# Patient Record
Sex: MALE | Race: WHITE | NOT HISPANIC OR LATINO | Employment: FULL TIME | URBAN - METROPOLITAN AREA
[De-identification: names, ages, dates, MRNs, and addresses within clinical notes are randomized per-mention and may not be internally consistent; named-entity substitution may affect disease eponyms.]

---

## 2017-06-21 ENCOUNTER — GENERIC CONVERSION - ENCOUNTER (OUTPATIENT)
Dept: OTHER | Facility: OTHER | Age: 42
End: 2017-06-21

## 2018-01-11 NOTE — MISCELLANEOUS
Message   Recorded as Task   Date: 06/21/2017 09:41 AM, Created By: Judson Fontaine   Task Name: Follow Up   Assigned To: Idalia Manjarrez   Regarding Patient: Tyson Gan, Status: Active   Comment:    Michael Wenla - 21 Jun 2017 9:41 AM     TASK CREATED  Please call patient to schedule 3 year f/u appointment  Thank you  Per assigned task, I left message for ÁNGEL HERNANDEZ patient about scheduling an appointment at our office since patient is overdue for a follow up  Active Problems    1  Abnormal blood level of iron (790 6) (R79 0)   2  Arthritis (716 90) (M19 90)   3  Morbid or severe obesity due to excess calories (278 01) (E66 01)   4  Obesity (278 00) (E66 9)   5  Postgastrectomy malabsorption (579 3) (K91 2,Z90 3)   6  Pre-operative cardiovascular examination (V72 81) (Z01 810)   7  Status post gastric bypass for obesity (V45 86) (Z98 84)    Current Meds   1  Calcium + D TABS; TAKE AS DIRECTED - taking 2 tablets daily; Therapy: [phone] to Recorded   2  Multivitamins Oral Capsule Recorded   3  Vitamin C TABS; take 50,000 iu weekly; Therapy: [phone] to Recorded   4  Vitamin D3 Liquid Recorded    Allergies    1  No Known Drug Allergies    2   Animal dander - Cats    Signatures   Electronically signed by : Megan Lennon, ; Jun 21 2017 10:00AM EST                       (Author)

## 2018-07-09 ENCOUNTER — TELEPHONE (OUTPATIENT)
Dept: BARIATRICS | Facility: CLINIC | Age: 43
End: 2018-07-09

## 2018-07-09 NOTE — TELEPHONE ENCOUNTER
----- Message from Chris Kapoor RN sent at 7/3/2018 10:37 AM EDT -----  Regardin yr f/u appointment  Please call patient to schedule f/u appointment with office and document when call is completed  Thank you

## 2018-12-31 ENCOUNTER — HOSPITAL ENCOUNTER (EMERGENCY)
Facility: HOSPITAL | Age: 43
Discharge: HOME | End: 2018-12-31
Attending: EMERGENCY MEDICINE
Payer: COMMERCIAL

## 2018-12-31 ENCOUNTER — APPOINTMENT (EMERGENCY)
Dept: RADIOLOGY | Facility: HOSPITAL | Age: 43
End: 2018-12-31
Payer: COMMERCIAL

## 2018-12-31 VITALS
SYSTOLIC BLOOD PRESSURE: 179 MMHG | TEMPERATURE: 98.6 F | OXYGEN SATURATION: 97 % | HEART RATE: 88 BPM | BODY MASS INDEX: 58.94 KG/M2 | RESPIRATION RATE: 18 BRPM | WEIGHT: 315 LBS | DIASTOLIC BLOOD PRESSURE: 104 MMHG
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
## 2018-12-31 DIAGNOSIS — S82.899A ANKLE FRACTURE: Primary | ICD-10-CM

## 2018-12-31 PROCEDURE — 90715 TDAP VACCINE 7 YRS/> IM: CPT | Performed by: PHYSICIAN ASSISTANT

## 2018-12-31 PROCEDURE — 99284 EMERGENCY DEPT VISIT MOD MDM: CPT

## 2018-12-31 PROCEDURE — 73610 X-RAY EXAM OF ANKLE: CPT

## 2018-12-31 PROCEDURE — 90471 IMMUNIZATION ADMIN: CPT

## 2018-12-31 RX ORDER — TRAMADOL HYDROCHLORIDE 50 MG/1
50 TABLET ORAL EVERY 6 HOURS PRN
Qty: 12 TABLET | Refills: 0 | Status: SHIPPED | OUTPATIENT
Start: 2018-12-31 | End: 2019-01-12

## 2018-12-31 RX ORDER — OXYCODONE HYDROCHLORIDE AND ACETAMINOPHEN 5; 325 MG/1; MG/1
1 TABLET ORAL EVERY 6 HOURS PRN
Qty: 12 TABLET | Refills: 0 | Status: SHIPPED | OUTPATIENT
Start: 2018-12-31 | End: 2018-12-31

## 2018-12-31 RX ORDER — BACITRACIN, NEOMYCIN, POLYMYXIN B 400; 3.5; 5 [USP'U]/G; MG/G; [USP'U]/G
1 OINTMENT TOPICAL ONCE
Status: COMPLETED | OUTPATIENT
Start: 2018-12-31 | End: 2018-12-31

## 2018-12-31 RX ORDER — OXYCODONE HYDROCHLORIDE AND ACETAMINOPHEN 5; 325 MG/1; MG/1
1 TABLET ORAL ONCE
Status: DISCONTINUED | OUTPATIENT
Start: 2018-12-31 | End: 2018-12-31

## 2018-12-31 RX ORDER — TRAMADOL HYDROCHLORIDE 50 MG/1
50 TABLET ORAL ONCE
Status: COMPLETED | OUTPATIENT
Start: 2018-12-31 | End: 2018-12-31

## 2018-12-31 RX ADMIN — BACITRACIN, NEOMYCIN, POLYMYXIN B 1 SMALL APPLICATION: 400; 3.5; 5 OINTMENT TOPICAL at 09:24

## 2018-12-31 RX ADMIN — TRAMADOL HYDROCHLORIDE 50 MG: 50 TABLET, COATED ORAL at 09:01

## 2018-12-31 RX ADMIN — TETANUS TOXOID, REDUCED DIPHTHERIA TOXOID AND ACELLULAR PERTUSSIS VACCINE, ADSORBED 0.5 ML: 5; 2.5; 8; 8; 2.5 SUSPENSION INTRAMUSCULAR at 09:24
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
## 2020-08-13 ENCOUNTER — OFFICE VISIT (OUTPATIENT)
Dept: URGENT CARE | Facility: MEDICAL CENTER | Age: 45
End: 2020-08-13
Payer: COMMERCIAL

## 2020-08-13 VITALS
BODY MASS INDEX: 42.66 KG/M2 | DIASTOLIC BLOOD PRESSURE: 104 MMHG | SYSTOLIC BLOOD PRESSURE: 142 MMHG | OXYGEN SATURATION: 100 % | WEIGHT: 315 LBS | HEART RATE: 78 BPM | RESPIRATION RATE: 18 BRPM | TEMPERATURE: 97.9 F | HEIGHT: 72 IN

## 2020-08-13 DIAGNOSIS — L02.92 BOIL: Primary | ICD-10-CM

## 2020-08-13 PROCEDURE — 99213 OFFICE O/P EST LOW 20 MIN: CPT | Performed by: PHYSICIAN ASSISTANT

## 2020-08-13 RX ORDER — SULFAMETHOXAZOLE AND TRIMETHOPRIM 800; 160 MG/1; MG/1
1 TABLET ORAL EVERY 12 HOURS SCHEDULED
Qty: 14 TABLET | Refills: 0 | Status: SHIPPED | OUTPATIENT
Start: 2020-08-13 | End: 2020-08-20

## 2020-08-13 NOTE — PROGRESS NOTES
[facility]        NAME: Vince Conway is a 41 y o  male  : 1975    MRN: [id number]  DATE: 2020  TIME: 5:05 PM    Assessment and Plan   Boil [L02 92]  1  Boil  sulfamethoxazole-trimethoprim (BACTRIM DS) 800-160 mg per tablet         Patient Instructions     1  Take Bactrim DS 1 tablet twice daily x 7 days  2  Apply warm compresses to the skin 2-3x daily until resolved  3  Motrin as needed for pain  4  Follow-up for I &D if symptoms persist or worsen  Chief Complaint     Chief Complaint   Patient presents with    Mass     Mass on right side of pt's neck x 5 days  History of Present Illness       Caryn Bui is a 43-year-old male presents with a right-sided facial lesion developed over the past 5 days  Patient reports symptoms started with a pimple the gradually increased in size  Patient has been applying warm compresses to the area and has noticed improvement over the past 24 hours  He denies any fever or drainage from the skin lesion  Review of Systems   Review of Systems   Constitutional: Negative  HENT: Negative  Respiratory: Negative  Gastrointestinal: Negative  Skin: Positive for color change           Current Medications       Current Outpatient Medications:     sulfamethoxazole-trimethoprim (BACTRIM DS) 800-160 mg per tablet, Take 1 tablet by mouth every 12 (twelve) hours for 7 days, Disp: 14 tablet, Rfl: 0    Current Allergies     Allergies as of 2020 - Reviewed 2018   Allergen Reaction Noted    Nsaids  2015            The following portions of the patient's history were reviewed and updated as appropriate: allergies, current medications, past family history, past medical history, past social history, past surgical history and problem list      Past Medical History:   Diagnosis Date    Obesity        Past Surgical History:   Procedure Laterality Date    ANKLE SURGERY      BARIATRIC SURGERY      TOTAL HIP ARTHROPLASTY         Family History   Problem Relation Age of Onset    No Known Problems Mother     Cancer Father          Medications have been verified  Objective   BP (!) 142/104   Pulse 78   Temp 97 9 °F (36 6 °C) (Temporal)   Resp 18   Ht 6' (1 829 m)   Wt (!) 203 kg (447 lb 3 2 oz)   SpO2 100%   BMI 60 65 kg/m²        Physical Exam     Physical Exam  Constitutional:       Appearance: Normal appearance  HENT:      Head: Normocephalic and atraumatic  Right Ear: Tympanic membrane and ear canal normal       Left Ear: Tympanic membrane and ear canal normal       Nose: Nose normal       Mouth/Throat:      Lips: Pink  Pharynx: Oropharynx is clear  Cardiovascular:      Rate and Rhythm: Normal rate and regular rhythm  Heart sounds: No murmur  Pulmonary:      Effort: Pulmonary effort is normal       Breath sounds: Normal breath sounds  Skin:         Neurological:      Mental Status: He is alert

## 2020-08-13 NOTE — PATIENT INSTRUCTIONS
1  Take Bactrim DS 1 tablet twice daily x 7 days  2  Apply warm compresses to the skin 2-3x daily until resolved  3  Motrin as needed for pain  4  Follow-up for I &D if symptoms persist or worsen